# Patient Record
(demographics unavailable — no encounter records)

---

## 2025-04-02 NOTE — PHYSICAL EXAM

## 2025-04-02 NOTE — HISTORY OF PRESENT ILLNESS
[Parents] : parents [whole ___ oz/d] : consumes [unfilled] oz of whole cow's milk per day [Fruit] : fruit [Vegetables] : vegetables [Meat] : meat [Grains] : grains [Eggs] : eggs [Fish] : fish [Dairy] : dairy [___ stools every other day] : [unfilled]  stools every other day [Normal] : Normal [In bed] : In bed [Bottle Use] : Bottle use [Brushing teeth] : Brushing teeth [Yes] : Patient goes to dentist yearly [Toothpaste] : Primary Fluoride Source: Toothpaste [Appropiate parent-child communication] : Appropriate parent-child communication [Child Cooperates] : Child cooperates [Parent has appropriate responses to behavior] : Parent has appropriate responses to behavior [No] : Not at  exposure [Water heater temperature set at <120 degrees F] : Water heater temperature set at <120 degrees F [Car seat in back seat] : Car seat in back seat [Smoke Detectors] : Smoke detectors [Supervised play near cars and streets] : Supervised play near cars and streets [Carbon Monoxide Detectors] : Carbon monoxide detectors [Up to date] : Up to date [NO] : No [Exposure to electronic nicotine delivery system] : No exposure to electronic nicotine delivery system [LastFluorideTreatment] : 12/24

## 2025-04-02 NOTE — DEVELOPMENTAL MILESTONES
[None] : none [Goes to the bathroom and urinates] : goes to bathroom and urinates by self [Plays and shares with others] : plays and shares with others [Put on coat, jacket, or shirt by self] : puts on coat, jacket, or shirt by self [Begins to play make-believe] : begins to play make-believe [Eats independently] : eats independently [Uses 3-word sentences] : uses 3-word sentences [Uses words that are 75% intelligible] : uses words that are 75% intelligible to strangers [Understands simple prepositions] : understands simple prepositions [Tells a story from a book or TV] : tells a story from a book or TV [Compares things using words such] : compares things using words such as bigger or shorter [Pedals tricycle] : pedals tricycle [Climbs on and off couch] : climbs on and off couch or chair [Jumps forward] : jumps forward [Draws a single [place]] : draws a single [place] [Draws a person with head] : draws a person with head and one other body part [Cuts with child scissor] : cuts with child scissor

## 2025-04-02 NOTE — DEVELOPMENTAL MILESTONES
[None] : none [Goes to the bathroom and urinates] : goes to bathroom and urinates by self [Plays and shares with others] : plays and shares with others [Put on coat, jacket, or shirt by self] : puts on coat, jacket, or shirt by self [Begins to play make-believe] : begins to play make-believe [Eats independently] : eats independently [Uses 3-word sentences] : uses 3-word sentences [Uses words that are 75% intelligible] : uses words that are 75% intelligible to strangers [Understands simple prepositions] : understands simple prepositions [Tells a story from a book or TV] : tells a story from a book or TV [Compares things using words such] : compares things using words such as bigger or shorter [Pedals tricycle] : pedals tricycle [Climbs on and off couch] : climbs on and off couch or chair [Jumps forward] : jumps forward [Draws a single [place]] : draws a single [place] [Draws a person with head] : draws a person with head and one other body part [Cuts with child scissor] : cuts with child scissor

## 2025-04-02 NOTE — DISCUSSION/SUMMARY
[Normal Growth] : growth [Normal Development] : development [FreeTextEntry1] : 3F with hx eczema (now improved) and constipation here for WCC. Growing/developing appropriately. Eating balanced diet, sees dentist regularly. Toilet trained. Occasionally uses bottle for milk before bed. Stools once every 2 days.   # HCM - Discussed continuing balanced diet with increase in fruits/vegetables and water for constipation - Discussed stopping bottle use - Continue moisturizing to prevent eczema flares  Continue balanced diet with all food groups.  Brush teeth twice a day with toothbrush. Recommend visit to dentist.  As per car seat 's guidelines, use forward-facing car seat in back seat of car.  Switch to booster seat when child reaches highest weight/height for seat.  Child needs to ride in a belt-positioning booster seat until  4 feet 9 inches has been reached and are between 8 and 12 years of age.  Put toddler to sleep in own bed.  Help toddler to maintain consistent daily routines and sleep schedule.  Pre-K discussed.  Ensure home is safe.  Use consistent, positive discipline.  Read aloud to toddler.  Limit screen time to no more than 2 hours per day.  SDOH domains were screened and scored negative. Return in 1 year for WCC.